# Patient Record
Sex: FEMALE | Race: WHITE | NOT HISPANIC OR LATINO
[De-identification: names, ages, dates, MRNs, and addresses within clinical notes are randomized per-mention and may not be internally consistent; named-entity substitution may affect disease eponyms.]

---

## 2021-11-29 ENCOUNTER — DASHBOARD ENCOUNTERS (OUTPATIENT)
Age: 41
End: 2021-11-29

## 2021-11-29 ENCOUNTER — TELEPHONE ENCOUNTER (OUTPATIENT)
Dept: URBAN - METROPOLITAN AREA CLINIC 78 | Facility: CLINIC | Age: 41
End: 2021-11-29

## 2021-11-29 ENCOUNTER — OFFICE VISIT (OUTPATIENT)
Dept: URBAN - METROPOLITAN AREA CLINIC 78 | Facility: CLINIC | Age: 41
End: 2021-11-29
Payer: COMMERCIAL

## 2021-11-29 VITALS
HEART RATE: 63 BPM | TEMPERATURE: 97.3 F | SYSTOLIC BLOOD PRESSURE: 109 MMHG | WEIGHT: 170 LBS | BODY MASS INDEX: 30.12 KG/M2 | HEIGHT: 63 IN | DIASTOLIC BLOOD PRESSURE: 74 MMHG

## 2021-11-29 DIAGNOSIS — K58.9 IBS (IRRITABLE BOWEL SYNDROME): ICD-10-CM

## 2021-11-29 DIAGNOSIS — K59.01 CONSTIPATION: ICD-10-CM

## 2021-11-29 DIAGNOSIS — R74.8 ELEVATED LIVER ENZYMES: ICD-10-CM

## 2021-11-29 DIAGNOSIS — K76.0 HEPATIC STEATOSIS: ICD-10-CM

## 2021-11-29 PROBLEM — 10743008 IRRITABLE BOWEL SYNDROME: Status: ACTIVE | Noted: 2021-11-29

## 2021-11-29 PROBLEM — 197321007 STEATOSIS OF LIVER: Status: ACTIVE | Noted: 2021-11-29

## 2021-11-29 PROBLEM — 14760008 CONSTIPATION: Status: ACTIVE | Noted: 2021-11-29

## 2021-11-29 PROCEDURE — 99204 OFFICE O/P NEW MOD 45 MIN: CPT | Performed by: INTERNAL MEDICINE

## 2021-11-29 PROCEDURE — 99244 OFF/OP CNSLTJ NEW/EST MOD 40: CPT | Performed by: INTERNAL MEDICINE

## 2021-11-29 RX ORDER — SODIUM SULFATE, MAGNESIUM SULFATE, AND POTASSIUM CHLORIDE 17.75; 2.7; 2.25 G/1; G/1; G/1
12 TABLETS TABLET ORAL
Qty: 24 TABLETS | Refills: 0 | OUTPATIENT
Start: 2021-11-29 | End: 2021-11-30

## 2021-11-29 NOTE — HPI-TODAY'S VISIT:
I had last seen the patient in 2018. She presents on referral from Malika Lawson PA-C , for a surveillance colonoscopy. A copy of this note will be sent to the referring physician.   She had a colonoscopy ~2015. She was never able to find her prior colonoscopy report. She recalls she had a couple of polyps removed which were "unremarkable" but because of a FH of colon polyps she was told to have a repeat in 5 years.   She has a history of IBS-C. She was placed on Linzess but she didn't feel that this helped at all. She is now using Magnesium adn Citric acid which helps with having a BM every 3-4 days as opposed to every 7-10 days. She still has some bloating and back pain on and off.   She has never been on Trulance, Motegrity, or Amitiza.    She denies any heartubrn, dysphagia or odynophagia. She has not had abdominal pain, nausea or vomiting. Appettite has been good.  The patient's mother had liver disease. She apaprently contracted Hep B after a blood transfusion and in addition to HEBERT, developed cirrhosis.   The patient herself weighed 300lbs and underwent a gastric bypass, which has helped with a lot of her medical problems. She subsequently had an abdominoplasty.  She states that lately her liver enzymes had normalized, as noted on her last blood draw a few months ago. She has a history of elevated LFT's. She had a RUQ US which showed a fatty liver. The patient states that she doesn't drink alcohol.   Summary of prior workup:  - Bloodwork on 4/25/18 showed a TP of 7.1, albumin 4.3, AP 60, AST 24, ALT 51. A viral hepatitis panel was negative. Ferritin 95. DEYA negative. Ceruloplasmin 28. Transferrin 265. GGT 12. - RUQ ultrasound on 4/25/18 showed diffuse fatty infiltration of the liver without hepatic lesion. GB with sludge, no stones. Otherwise unremarkable study.

## 2022-02-21 PROBLEM — 428283002: Status: ACTIVE | Noted: 2021-11-29

## 2022-02-23 ENCOUNTER — OFFICE VISIT (OUTPATIENT)
Dept: URBAN - METROPOLITAN AREA SURGERY CENTER 15 | Facility: SURGERY CENTER | Age: 42
End: 2022-02-23
Payer: COMMERCIAL

## 2022-02-23 ENCOUNTER — CLAIMS CREATED FROM THE CLAIM WINDOW (OUTPATIENT)
Dept: URBAN - METROPOLITAN AREA CLINIC 4 | Facility: CLINIC | Age: 42
End: 2022-02-23
Payer: COMMERCIAL

## 2022-02-23 DIAGNOSIS — K63.5 BENIGN COLON POLYP: ICD-10-CM

## 2022-02-23 DIAGNOSIS — Z86.010 ADENOMAS PERSONAL HISTORY OF COLONIC POLYPS: ICD-10-CM

## 2022-02-23 DIAGNOSIS — K63.89 GASEOUS DISTENTION OF INTESTINE DETERMINED BY X-RAY: ICD-10-CM

## 2022-02-23 PROCEDURE — 45385 COLONOSCOPY W/LESION REMOVAL: CPT | Performed by: INTERNAL MEDICINE

## 2022-02-23 PROCEDURE — 88305 TISSUE EXAM BY PATHOLOGIST: CPT | Performed by: PATHOLOGY

## 2022-02-23 PROCEDURE — G8907 PT DOC NO EVENTS ON DISCHARG: HCPCS | Performed by: INTERNAL MEDICINE
